# Patient Record
(demographics unavailable — no encounter records)

---

## 2024-10-11 NOTE — HISTORY OF PRESENT ILLNESS
[de-identified] : Ernst returns for follow-up of her scoliosis.  She is seen in the office again today with her father.  She has no complaints regarding the brace and she has been wearing it full-time.  After adjustments were made to the brace 6 weeks ago within 10 days or so she could wear it snugly again.

## 2024-10-11 NOTE — PHYSICAL EXAM
[de-identified] : Examination out of the brace shows no evidence of any skin issues as a result of pad pressure.

## 2024-10-11 NOTE — DISCUSSION/SUMMARY
[de-identified] : I made adjustments to the brace adding extra bumpers all around to the thoracic pad.  The brace is reapplied and she will wear it snugly.  I will see her for follow-up in 6 weeks.  An x-ray is not necessary today.

## 2024-11-22 NOTE — DISCUSSION/SUMMARY
[Medication Risks Reviewed] : Medication risks reviewed [de-identified] : As noted above I made adjustments to the brace adding pressure to the thoracolumbar curve.  She will continue with full-time use of the brace and I will see her for follow-up in early March.

## 2024-11-22 NOTE — HISTORY OF PRESENT ILLNESS
[de-identified] : Frank returns for follow-up of her scoliosis.  She is seen in the office today along with her father.  She has no complaints of back pain or problems with the brace.  She has not had her menarche.

## 2024-11-22 NOTE — PHYSICAL EXAM
[de-identified] : On exam the brace is worn snugly. [de-identified] : Her initial x-ray in July revealed a 28 degree thoracic curve with a 44 degree thoracolumbar curve and a 25 degree fractional lumbosacral curve at which point the iliac crest apophyses were 1+.  Her first x-ray in the brace on August 24 revealed the curves to be 33 degrees above 30 degrees in the thoracolumbar region and only a 10 degree fractional lumbosacral curve.  Today's x-ray after adjustments were made to the brace adding pressure to the major curve revealed curves of 33 degrees above 28 degrees in the thoracolumbar region and 8 degrees in the fractional lumbosacral curve and the iliac crest apophyses are now 3+.

## 2025-03-18 NOTE — DISCUSSION/SUMMARY
[de-identified] : We again discussed that the brace needs to be worn 23 hours a day.  I will see her for follow-up in early July.

## 2025-03-18 NOTE — PHYSICAL EXAM
[de-identified] : When she came in today the brace was removed snugly but the x-ray tech asked the patient to take her brace off for the x-ray. [de-identified] : Out of the brace for only a few minutes the curves are 34 degrees above and 40 degrees below.  That made me suspicious that she has not been wearing the brace and on questioning her that has been so.  The iliac crest apophyses are now 3-4+.

## 2025-03-18 NOTE — HISTORY OF PRESENT ILLNESS
[de-identified] : Ernst returns for follow-up of her scoliosis.  She had her menarche in either November or December of last year.  She cannot remember.  There are some days apparently she is not wearing her brace to school.  She has no complaints of back pain.

## 2025-07-18 NOTE — PHYSICAL EXAM
[de-identified] : On examination she is grown three quarters of an inch since March.  The brace is worn snugly.  X-ray in the brace revealed the right thoracic curve at 37 degrees with the thoracolumbar curve at 28 degrees and the fractional lumbosacral curve 13 degrees.  The iliac crest apophyses remain at 3-4+.

## 2025-07-18 NOTE — DISCUSSION/SUMMARY
[de-identified] : I had a long discussion with the patient and her father explaining that the brace needs to be worn 23 hours a day to have maximum effectiveness.  We discussed a prior study by an orthopedic surgeon with a good brace and a temperature sensor in the brace showing that if the brace was worn 23 hours a day it had a 90% chance of preventing significant progression of the curve but when out of the brace 8 hours a day only 60% chance of achieving the desired result.  We again discussed that she needs to wear the brace 23 hours a day and I will see her for follow-up in October.

## 2025-07-18 NOTE — HISTORY OF PRESENT ILLNESS
[de-identified] : Lissette returns for follow-up of her scoliosis with her father.  When she was initially seen in July of last year she had a 28 degree upper right thoracic curve with a 44 degree left thoracolumbar curve and 25 degree fractional lumbosacral curve.  She had 1+ iliac crest ossification at that point and she was started in a brace which she understood needed to be worn 23 hours a day.  Follow-up a month later in August revealed curves of 33 degrees above with a 30 degree from the left thoracolumbar curve and a 10 degree fractional lumbosacral curve in the iliac crest apophyses were still 1+.  She was lost to follow-up for 7 months and was not wearing the brace regularly.  When she came in at that point I obtained an x-ray out of the brace for only a few minutes with the right thoracic curve being 34 degrees, the left thoracolumbar curve 40 degrees iliac crest apophyses were 3-4+.  She reported that her menarche had been in December.  We discussed the necessity to wear the brace full-time.  She returns today and again it seems she is wearing the brace 16 hours a day at best.